# Patient Record
Sex: FEMALE | Race: WHITE | NOT HISPANIC OR LATINO | URBAN - METROPOLITAN AREA
[De-identification: names, ages, dates, MRNs, and addresses within clinical notes are randomized per-mention and may not be internally consistent; named-entity substitution may affect disease eponyms.]

---

## 2018-02-15 ENCOUNTER — IMPORTED ENCOUNTER (OUTPATIENT)
Dept: URBAN - METROPOLITAN AREA CLINIC 9 | Facility: CLINIC | Age: 48
End: 2018-02-15

## 2019-02-14 ENCOUNTER — IMPORTED ENCOUNTER (OUTPATIENT)
Dept: URBAN - METROPOLITAN AREA CLINIC 9 | Facility: CLINIC | Age: 49
End: 2019-02-14

## 2020-02-13 ENCOUNTER — IMPORTED ENCOUNTER (OUTPATIENT)
Dept: URBAN - METROPOLITAN AREA CLINIC 9 | Facility: CLINIC | Age: 50
End: 2020-02-13

## 2020-02-13 PROBLEM — H18.051: Noted: 2020-02-13

## 2020-02-13 PROBLEM — H17.11: Noted: 2020-02-13

## 2020-02-13 PROBLEM — H25.13: Noted: 2020-02-13

## 2020-02-13 PROBLEM — Z94.7: Noted: 2020-02-13

## 2020-02-13 PROBLEM — H18.052: Noted: 2020-02-13

## 2021-10-19 ASSESSMENT — VISUAL ACUITY
OD_CC: 20/40 - SN
OS_CC: 20/20 SN
OS_CC: 20/20 SN
OD_CC: 20/40 + SN
OD_CC: 20/30 + SN
OS_CC: 20/20 SN
OS_CC: 20/20 -2 SN
OD_CC: 20/30 -2 SN
OD_CC: 20/25 -2 SN

## 2021-10-19 ASSESSMENT — KERATOMETRY
OS_K1POWER_DIOPTERS: 44
OS_AXISANGLE2_DEGREES: 75
OD_AXISANGLE2_DEGREES: 107
OD_K1POWER_DIOPTERS: 48.25
OD_K2POWER_DIOPTERS: 50.5
OS_AXISANGLE_DEGREES: 165
OS_K2POWER_DIOPTERS: 44.5
OS_K2POWER_DIOPTERS: 44.5
OS_AXISANGLE2_DEGREES: 41
OS_K1POWER_DIOPTERS: 44
OD_AXISANGLE_DEGREES: 17
OS_AXISANGLE_DEGREES: 131

## 2021-10-19 ASSESSMENT — TONOMETRY
OS_IOP_MMHG: 13
OD_IOP_MMHG: 13
OS_IOP_MMHG: 15
OD_IOP_MMHG: 12
OD_IOP_MMHG: 9
OS_IOP_MMHG: 15

## 2021-12-30 ENCOUNTER — ESTABLISHED PATIENT (OUTPATIENT)
Dept: URBAN - METROPOLITAN AREA CLINIC 9 | Facility: CLINIC | Age: 51
End: 2021-12-30

## 2021-12-30 DIAGNOSIS — H17.11: ICD-10-CM

## 2021-12-30 DIAGNOSIS — H25.13: ICD-10-CM

## 2021-12-30 DIAGNOSIS — Z94.7: ICD-10-CM

## 2021-12-30 DIAGNOSIS — H18.051: ICD-10-CM

## 2021-12-30 PROCEDURE — 92014 COMPRE OPH EXAM EST PT 1/>: CPT

## 2021-12-30 RX ORDER — VALACYCLOVIR HYDROCHLORIDE 500 MG/1: TABLET, FILM COATED ORAL ONCE A DAY

## 2021-12-30 ASSESSMENT — TONOMETRY
OD_IOP_MMHG: 13
OS_IOP_MMHG: 18

## 2021-12-30 ASSESSMENT — VISUAL ACUITY
OD_CC: J2
OS_CC: 20/20
OS_CC: J1+
OD_CC: 20/30

## 2022-08-16 NOTE — PATIENT DISCUSSION
Discussed importance of removing contact lenses every night because of diabetes and corneal infection risk with overnight contact lens wear.

## 2022-10-13 ENCOUNTER — EMERGENCY VISIT (OUTPATIENT)
Dept: URBAN - METROPOLITAN AREA CLINIC 9 | Facility: CLINIC | Age: 52
End: 2022-10-13

## 2022-10-13 DIAGNOSIS — H17.11: ICD-10-CM

## 2022-10-13 DIAGNOSIS — H25.13: ICD-10-CM

## 2022-10-13 DIAGNOSIS — H18.051: ICD-10-CM

## 2022-10-13 DIAGNOSIS — Z94.7: ICD-10-CM

## 2022-10-13 PROCEDURE — 99214 OFFICE O/P EST MOD 30 MIN: CPT

## 2022-10-13 RX ORDER — GANCICLOVIR 1.5 MG/G: 1 GEL OPHTHALMIC TWICE A DAY

## 2022-10-13 RX ORDER — BUSPIRONE HYDROCHLORIDE 10 MG/1: 1 TABLET ORAL

## 2022-10-13 ASSESSMENT — VISUAL ACUITY
OS_CC: J1+
OS_CC: 20/20
OD_CC: 20/30+1
OU_CC: 20/20
OD_CC: J1

## 2022-10-13 ASSESSMENT — TONOMETRY
OD_IOP_MMHG: 11
OS_IOP_MMHG: 12

## 2022-12-08 ENCOUNTER — ESTABLISHED PATIENT (OUTPATIENT)
Dept: URBAN - METROPOLITAN AREA CLINIC 9 | Facility: CLINIC | Age: 52
End: 2022-12-08

## 2022-12-08 PROCEDURE — 92015 DETERMINE REFRACTIVE STATE: CPT

## 2022-12-08 PROCEDURE — 99214 OFFICE O/P EST MOD 30 MIN: CPT

## 2022-12-08 ASSESSMENT — VISUAL ACUITY
OS_CC: 20/20
OS_CC: J1+
OD_CC: 20/30-2
OD_CC: J1+

## 2022-12-08 ASSESSMENT — TONOMETRY
OS_IOP_MMHG: 16
OD_IOP_MMHG: 14

## 2024-04-23 ENCOUNTER — ESTABLISHED PATIENT (OUTPATIENT)
Dept: URBAN - METROPOLITAN AREA CLINIC 17 | Facility: CLINIC | Age: 54
End: 2024-04-23

## 2024-04-23 DIAGNOSIS — H17.11: ICD-10-CM

## 2024-04-23 DIAGNOSIS — B00.52: ICD-10-CM

## 2024-04-23 DIAGNOSIS — H18.511: ICD-10-CM

## 2024-04-23 DIAGNOSIS — H18.052: ICD-10-CM

## 2024-04-23 DIAGNOSIS — H25.13: ICD-10-CM

## 2024-04-23 PROCEDURE — 99214 OFFICE O/P EST MOD 30 MIN: CPT

## 2024-04-23 PROCEDURE — 92015 DETERMINE REFRACTIVE STATE: CPT

## 2024-04-23 ASSESSMENT — VISUAL ACUITY
OD_CC: 20/30-2
OS_CC: 20/20

## 2024-04-23 ASSESSMENT — TONOMETRY
OD_IOP_MMHG: 14
OS_IOP_MMHG: 14